# Patient Record
Sex: FEMALE | Race: BLACK OR AFRICAN AMERICAN | NOT HISPANIC OR LATINO | Employment: FULL TIME | URBAN - METROPOLITAN AREA
[De-identification: names, ages, dates, MRNs, and addresses within clinical notes are randomized per-mention and may not be internally consistent; named-entity substitution may affect disease eponyms.]

---

## 2018-05-15 ENCOUNTER — CLINICAL SUPPORT (OUTPATIENT)
Dept: OCCUPATIONAL MEDICINE | Facility: CLINIC | Age: 63
End: 2018-05-15

## 2018-05-15 DIAGNOSIS — Z02.1 DRUG TESTING, PRE-EMPLOYMENT: ICD-10-CM

## 2018-05-15 PROCEDURE — 80305 DRUG TEST PRSMV DIR OPT OBS: CPT | Mod: S$GLB,,, | Performed by: EMERGENCY MEDICINE

## 2020-09-18 ENCOUNTER — CLINICAL SUPPORT (OUTPATIENT)
Dept: OTHER | Facility: CLINIC | Age: 65
End: 2020-09-18

## 2020-09-18 DIAGNOSIS — Z00.8 ENCOUNTER FOR OTHER GENERAL EXAMINATION: ICD-10-CM

## 2020-10-02 VITALS — HEIGHT: 61 IN

## 2020-10-02 LAB
GLUCOSE SERPL-MCNC: 96 MG/DL (ref 60–140)
HDLC SERPL-MCNC: 53 MG/DL
POC CHOLESTEROL, LDL (DOCK): 122 MG/DL
POC CHOLESTEROL, TOTAL: 186 MG/DL
TRIGL SERPL-MCNC: 58 MG/DL

## 2021-08-13 ENCOUNTER — CLINICAL SUPPORT (OUTPATIENT)
Dept: OTHER | Facility: CLINIC | Age: 66
End: 2021-08-13

## 2021-08-13 DIAGNOSIS — Z00.8 ENCOUNTER FOR OTHER GENERAL EXAMINATION: ICD-10-CM

## 2021-08-14 VITALS — HEIGHT: 61 IN

## 2021-08-14 LAB
HDLC SERPL-MCNC: 56 MG/DL
POC CHOLESTEROL, TOTAL: 155 MG/DL
POC GLUCOSE, FASTING: 78 MG/DL (ref 60–110)
TRIGL SERPL-MCNC: 44 MG/DL

## 2022-08-18 ENCOUNTER — OCCUPATIONAL HEALTH (OUTPATIENT)
Dept: URGENT CARE | Facility: CLINIC | Age: 67
End: 2022-08-18
Payer: OTHER MISCELLANEOUS

## 2022-08-18 VITALS
HEART RATE: 60 BPM | TEMPERATURE: 97 F | BODY MASS INDEX: 28.89 KG/M2 | WEIGHT: 153 LBS | RESPIRATION RATE: 16 BRPM | HEIGHT: 61 IN | SYSTOLIC BLOOD PRESSURE: 139 MMHG | DIASTOLIC BLOOD PRESSURE: 69 MMHG | OXYGEN SATURATION: 97 %

## 2022-08-18 DIAGNOSIS — T21.21XA PARTIAL THICKNESS BURN OF RIGHT BREAST, INITIAL ENCOUNTER: ICD-10-CM
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
## 2022-08-18 DIAGNOSIS — Z02.83 ENCOUNTER FOR DRUG SCREENING: ICD-10-CM

## 2022-08-18 LAB
CTP QC/QA: YES
POC 10 PANEL DRUG SCREEN: NEGATIVE

## 2022-08-18 PROCEDURE — 99203 OFFICE O/P NEW LOW 30 MIN: CPT | Mod: S$GLB,,, | Performed by: PHYSICIAN ASSISTANT

## 2022-08-18 PROCEDURE — 80305 POCT RAPID DRUG SCREEN 10 PANEL: ICD-10-PCS | Mod: S$GLB,,, | Performed by: PHYSICIAN ASSISTANT

## 2022-08-18 PROCEDURE — 80305 DRUG TEST PRSMV DIR OPT OBS: CPT | Mod: S$GLB,,, | Performed by: PHYSICIAN ASSISTANT

## 2022-08-18 PROCEDURE — 99203 PR OFFICE/OUTPT VISIT, NEW, LEVL III, 30-44 MIN: ICD-10-PCS | Mod: S$GLB,,, | Performed by: PHYSICIAN ASSISTANT

## 2022-08-18 RX ORDER — TRIAMTERENE AND HYDROCHLOROTHIAZIDE 37.5; 25 MG/1; MG/1
1 CAPSULE ORAL EVERY MORNING
COMMUNITY

## 2022-08-18 RX ORDER — SILVER SULFADIAZINE 10 G/1000G
CREAM TOPICAL
Status: COMPLETED | OUTPATIENT
Start: 2022-08-18 | End: 2022-08-18

## 2022-08-18 RX ORDER — LEVOTHYROXINE SODIUM 25 UG/1
25 TABLET ORAL
COMMUNITY

## 2022-08-18 RX ORDER — SILVER SULFADIAZINE 10 G/1000G
CREAM TOPICAL DAILY
Qty: 25 G | Refills: 0 | Status: SHIPPED | OUTPATIENT
Start: 2022-08-18

## 2022-08-18 RX ADMIN — SILVER SULFADIAZINE: 10 CREAM TOPICAL at 09:08

## 2022-08-18 NOTE — LETTER
[facility] - [facility]  [address], [address]  [place] [address]  Phone: [phone]  Fax: [phone]  [facility]: [email or web address]    Pt Name: Tyra Gallegos Date: 08/18/2022   Employee ID: [id number] Date of First Treatment: 08/18/2022   Company: [employer]      Appointment Time: 08:30 AM Arrived: 08:54   Provider: Winnie Verde PA-C Time Out: 09:32     Office Treatment:   1. Work related injury    2. Partial thickness burn of right breast, initial encounter    3. Encounter for drug screening      Medications Ordered This Encounter   Medications    silver sulfADIAZINE 1% (SILVADENE) 1 % cream    silver sulfADIAZINE 1% cream        Cool compresses and Motrin/Tylenol as needed according to package labeling over the counter          Return Appointment: Attention not to aggravate the affected area. May ice and use cool compresses. Follow-up with Occupation Health Department in 1-2 days if needed.

## 2022-08-18 NOTE — PROGRESS NOTES
Subjective:       Patient ID: Tyra Crockett is a 66 y.o. female.    Chief Complaint: Burn    Patient's place of employment - [employer]  Patient's job title - food tech  Date of injury - 08/18/22  Body part injured including left or right - right breast  Injury Mechanism - burn   What they were doing when they got hurt - pulling a hot tray of food and water spilled on her chest  What they did immediately after - put a cool towel on her chest then put burn cream on  Pain scale right now - 5    Patient provider note starts here:  Patient presents with complaints of a burn on the right breast sustained at work this morning. 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 States that she put a cool towel over the area and then used a [employer] burn cream to the site. Reports that the burning sensation has continued.     Burn  The incident occurred 1 to 3 hours ago (around 07:00am). The burns occurred at a job site. The burns occurred while cooking. The burns were a result of contact with a hot liquid. The burns are located on the chest. The pain is at a severity of 5/10. The pain is moderate. She has tried salve (cool towel) for the symptoms. The treatment provided no relief.       Constitution: Negative for fever.   Neck: Negative for neck pain.   Cardiovascular: Negative for chest pain, palpitations and sob on exertion.   Respiratory: Negative for chest tightness and wheezing.    Gastrointestinal: Negative for abdominal pain, vomiting and diarrhea.   Skin: Positive for erythema. Negative for color change and wound.   Neurological: Negative for numbness and tingling.        Objective:      Physical Exam  Vitals and nursing note reviewed.   Constitutional:       General: She is not in acute distress.     Appearance: She is normal weight. She is not  ill-appearing, toxic-appearing or diaphoretic.   HENT:      Head: Normocephalic and atraumatic.      Nose: No congestion.      Mouth/Throat:      Pharynx: No posterior oropharyngeal erythema.   Eyes:      Conjunctiva/sclera: Conjunctivae normal.   Cardiovascular:      Rate and Rhythm: Normal rate and regular rhythm.      Pulses: Normal pulses.   Pulmonary:      Effort: Pulmonary effort is normal.      Breath sounds: Normal breath sounds.   Musculoskeletal:      Cervical back: Normal range of motion.   Skin:     Capillary Refill: Capillary refill takes less than 2 seconds.      Findings: Erythema present.      Comments: There is erythema noted over the right breast some of which extends to the aerola. There is a small fluid filled blister noted to the upper portion of the breast about the size of a nickel. The blister is intact. The first degree burn area is approx the size of the palm of the hand.    Neurological:      General: No focal deficit present.      Mental Status: She is alert.         Assessment:       1. Work related injury    2. Partial thickness burn of right breast, initial encounter    3. Encounter for drug screening        Patient with a first degree and a small overlying second degree partial thickness burn noted to the right breast sustained on hot water today. On exam, she is afebrile and nontoxic appearing. Silvadene was placed over the burn and it was dressed with nonstick gauze. Encouraged cool compresses, attention not to aggravate the affected area and Tylenol/Motrin OTC as needed for pain. Follow-up with Occupational Health in 1-2 days.   Plan:         Medications Ordered This Encounter   Medications    silver sulfADIAZINE 1% (SILVADENE) 1 % cream     Sig: Apply topically once daily.     Dispense:  25 g     Refill:  0    silver sulfADIAZINE 1% cream            No follow-ups on file.      Patient Instructions   Use cool compresses or ice over the area for continued burning sensation and  pain. You can take Tylenol and Motrin according to package instructions. Follow-up with the Occupational Healthy Department in 1-2 days if symptoms persist.      You must understand that you've received an Urgent Care treatment only and that you may be released before all your medical problems are known or treated. You, the patient, will arrange for follow up care as instructed.      Follow up with your PCP or specialty clinic as instructed in the next 2-3 days if not improved or as needed. You can call [phone] to schedule an appointment with appropriate provider.      If you condition worsens, we recommend that you receive another evaluation at the emergency room immediately or contact your primary medical clinic's after hours call service to discuss your concerns.      Please return here or go to the Emergency Department for any concerns or worsening condition.      If you were prescribed a narcotic or controlled substance, do not drive or operate heavy equipment or machinery while taking these medications.

## 2022-08-18 NOTE — PATIENT INSTRUCTIONS
Use cool compresses or ice over the area for continued burning sensation and pain. You can take Tylenol and Motrin according to package instructions. Follow-up with the Occupational Healthy Department in 1-2 days if symptoms persist.      You must understand that you've received an Urgent Care treatment only and that you may be released before all your medical problems are known or treated. You, the patient, will arrange for follow up care as instructed.      Follow up with your PCP or specialty clinic as instructed in the next 2-3 days if not improved or as needed. You can call [phone] to schedule an appointment with appropriate provider.      If you condition worsens, we recommend that you receive another evaluation at the emergency room immediately or contact your primary medical clinic's after hours call service to discuss your concerns.      Please return here or go to the Emergency Department for any concerns or worsening condition.      If you were prescribed a narcotic or controlled substance, do not drive or operate heavy equipment or machinery while taking these medications.

## 2022-08-23 ENCOUNTER — OFFICE VISIT (OUTPATIENT)
Dept: URGENT CARE | Facility: CLINIC | Age: 67
End: 2022-08-23
Payer: OTHER MISCELLANEOUS

## 2022-08-23 VITALS
WEIGHT: 153 LBS | DIASTOLIC BLOOD PRESSURE: 76 MMHG | OXYGEN SATURATION: 97 % | RESPIRATION RATE: 18 BRPM | BODY MASS INDEX: 28.89 KG/M2 | TEMPERATURE: 98 F | HEART RATE: 63 BPM | HEIGHT: 61 IN | SYSTOLIC BLOOD PRESSURE: 132 MMHG

## 2022-08-23 DIAGNOSIS — T21.21XA PARTIAL THICKNESS BURN OF BREAST, INITIAL ENCOUNTER: Primary | ICD-10-CM
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
## 2022-08-23 PROCEDURE — 99203 PR OFFICE/OUTPT VISIT, NEW, LEVL III, 30-44 MIN: ICD-10-PCS | Mod: S$GLB,,, | Performed by: NURSE PRACTITIONER

## 2022-08-23 PROCEDURE — 99203 OFFICE O/P NEW LOW 30 MIN: CPT | Mod: S$GLB,,, | Performed by: NURSE PRACTITIONER

## 2022-08-23 NOTE — PROGRESS NOTES
Subjective:       Patient ID: Tyra Crockett is a 67 y.o. female.    Chief Complaint: Breast Pain (right)    New patient RV CPS (DOI) 8/17/22  She was at work in the kitchen.And she went to pull the pan of out the oven and water from the pan splash her on her right breast.Her pain is 0/10 LM    Patient was seen at [facility] - she was given a rx for silvadene and has been applying daily . She states  when touch otherwise normal      Constitution: Negative. Negative for generalized weakness.   HENT: Negative.    Cardiovascular: Negative.  Negative for chest pain.   Respiratory: Negative.    Gastrointestinal: Negative.    Endocrine: negative.   Genitourinary: Negative.  Negative for frequency and urgency.   Musculoskeletal: Negative.  Negative for pain.   Skin: Positive for color change and bruising. Negative for wound.   Allergic/Immunologic: Negative.  Positive for itching.   Neurological: Negative.  Negative for numbness and tingling.   Hematologic/Lymphatic: Negative.    Psychiatric/Behavioral: Negative.  Negative for sleep disturbance.        Objective:      Physical Exam  Vitals and nursing note reviewed.   Constitutional:       Appearance: She is well-developed.   HENT:      Head: Normocephalic and atraumatic.      Right Ear: External ear normal.      Left Ear: External ear normal.      Nose: Nose normal.   Eyes:      Conjunctiva/sclera: Conjunctivae normal.      Pupils: Pupils are equal, round, and reactive to light.   Cardiovascular:      Rate and Rhythm: Normal rate and regular rhythm.      Heart sounds: Normal heart sounds.   Chest:       Abdominal:      General: Bowel sounds are normal.      Palpations: Abdomen is soft.   Musculoskeletal:         General: Normal range of motion.      Cervical back: Normal range of motion and neck supple.   Skin:     General: Skin is warm and dry.      Capillary Refill: Capillary refill takes less than 2 seconds.   Neurological:      Mental  Status: She is alert and oriented to person, place, and time.   Psychiatric:         Behavior: Behavior normal.         Thought Content: Thought content normal.         Judgment: Judgment normal.         Assessment:       1. Partial thickness burn of breast, initial encounter        Plan:     burn healing well. No complaints. Patient will follow up as needed.        Patient Instructions: Attention not to aggravate affected area, Keep dressing clean/dry/covered   Restrictions: Regular Duty, Discharged from Occupational Health  Follow up if symptoms worsen or fail to improve.

## 2022-08-23 NOTE — LETTER
[facility] - Occupational [facility]  [address] [address]  Phone: [phone]  Fax: [phone]  [facility]: [email or web address]    Pt Name: Tyra Gallegos Date: 08/18/2022   Employee ID: [id number] Date of Treatment: 08/23/2022   Company: [employer]      Appointment Time: 02:00 PM Arrived: 1:39 PM   Provider: ARASELI Weaver Time Out: 3:00 PM     Office Treatment:   1. Partial thickness burn of breast, initial encounter    2. Work related injury          Patient Instructions: Attention not to aggravate affected area, Keep dressing clean/dry/covered      Restrictions: Regular Duty, Discharged from Occupational Health     Return As needed. MARCUS

## 2022-09-13 ENCOUNTER — CLINICAL SUPPORT (OUTPATIENT)
Dept: OTHER | Facility: CLINIC | Age: 67
End: 2022-09-13

## 2022-09-13 DIAGNOSIS — Z00.8 ENCOUNTER FOR OTHER GENERAL EXAMINATION: ICD-10-CM

## 2022-09-14 VITALS
WEIGHT: 151.19 LBS | DIASTOLIC BLOOD PRESSURE: 75 MMHG | BODY MASS INDEX: 28.55 KG/M2 | SYSTOLIC BLOOD PRESSURE: 137 MMHG | HEIGHT: 61 IN

## 2022-09-14 LAB
GLUCOSE SERPL-MCNC: 85 MG/DL (ref 60–140)
HDLC SERPL-MCNC: 63 MG/DL
POC CHOLESTEROL, LDL (DOCK): 93 MG/DL
POC CHOLESTEROL, TOTAL: 166 MG/DL
TRIGL SERPL-MCNC: 48 MG/DL